# Patient Record
Sex: MALE | Race: WHITE | NOT HISPANIC OR LATINO | Employment: OTHER | URBAN - METROPOLITAN AREA
[De-identification: names, ages, dates, MRNs, and addresses within clinical notes are randomized per-mention and may not be internally consistent; named-entity substitution may affect disease eponyms.]

---

## 2024-09-09 PROBLEM — F10.20 ALCOHOLISM (MULTI): Status: RESOLVED | Noted: 2023-03-22 | Resolved: 2024-09-09

## 2024-10-03 ENCOUNTER — OFFICE VISIT (OUTPATIENT)
Dept: CARDIOLOGY | Facility: CLINIC | Age: 69
End: 2024-10-03
Payer: MEDICARE

## 2024-10-03 VITALS
HEART RATE: 77 BPM | BODY MASS INDEX: 24.41 KG/M2 | WEIGHT: 180 LBS | OXYGEN SATURATION: 94 % | DIASTOLIC BLOOD PRESSURE: 76 MMHG | SYSTOLIC BLOOD PRESSURE: 153 MMHG

## 2024-10-03 DIAGNOSIS — I71.41 PARARENAL ABDOMINAL AORTIC ANEURYSM (AAA) WITHOUT RUPTURE (CMS-HCC): Primary | ICD-10-CM

## 2024-10-03 DIAGNOSIS — I77.810 DILATION OF THORACIC AORTA (CMS-HCC): ICD-10-CM

## 2024-10-03 PROCEDURE — 3078F DIAST BP <80 MM HG: CPT | Performed by: INTERNAL MEDICINE

## 2024-10-03 PROCEDURE — 99214 OFFICE O/P EST MOD 30 MIN: CPT | Performed by: INTERNAL MEDICINE

## 2024-10-03 PROCEDURE — 1159F MED LIST DOCD IN RCRD: CPT | Performed by: INTERNAL MEDICINE

## 2024-10-03 PROCEDURE — 3077F SYST BP >= 140 MM HG: CPT | Performed by: INTERNAL MEDICINE

## 2024-10-03 PROCEDURE — 1036F TOBACCO NON-USER: CPT | Performed by: INTERNAL MEDICINE

## 2024-10-03 PROCEDURE — 4010F ACE/ARB THERAPY RXD/TAKEN: CPT | Performed by: INTERNAL MEDICINE

## 2024-10-03 PROCEDURE — 1160F RVW MEDS BY RX/DR IN RCRD: CPT | Performed by: INTERNAL MEDICINE

## 2024-10-03 NOTE — PROGRESS NOTES
Referred by Oscar Chavez DO      History of Present Illness:  Lorenzo Palafox Jr. is a/an 69 y.o. man with hyperlipidemia, hypertension, diabetes, and tobacco dependence who is referred for small suprarenal abdominal aortic aneurysm. He is asymptomatic.    He sees Dr. Lou for leaky valve. Seems to have been prescribed pravastatin and atorvastatin--both showing up in his med list.    Past Medical History:   Diagnosis Date    Actinic keratosis 03/22/2023    Acute conjunctivitis 03/22/2023    Acute sinusitis 03/22/2023    Allergic rhinitis 03/22/2023    Chronic sinusitis, unspecified     Sinusitis    Cloudy urine 03/22/2023    Cough, unspecified 02/17/2017    Cough    Dermatochalasis 01/24/2020    Dermatochalasis of unspecified eye, unspecified eyelid 01/24/2020    Dermatochalasis    Effusion, left knee 12/28/2015    Knee effusion, left    History of cardiomyopathy 06/22/2023    History of depression 06/14/2022    History of diabetes mellitus 06/22/2023    Injury of right ankle 06/22/2023    Injury of right foot 06/22/2023    Laryngitis 03/22/2023    Low back pain 03/22/2023    Mouth lesion 03/22/2023    Other conditions influencing health status     Childhood Absence Seizure    Other specified disorders of Eustachian tube, unspecified ear 03/19/2014    Eustachian tube dysfunction    Other underimmunization status 06/14/2022    Personal history of other diseases of the circulatory system     History of coronary artery disease    Personal history of other diseases of the circulatory system 02/17/2017    History of valvular heart disease    Personal history of other diseases of the circulatory system     History of hypertrophic cardiomyopathy    Personal history of other diseases of the nervous system and sense organs     History of deafness    Personal history of other diseases of the respiratory system     History of bronchitis    Personal history of other endocrine, nutritional and metabolic disease     History of  hyperlipidemia    Personal history of other endocrine, nutritional and metabolic disease     History of diabetes mellitus    Personal history of other malignant neoplasm of skin 02/17/2017    History of malignant neoplasm of skin    Personal history of other mental and behavioral disorders     History of depression    Personal history of other mental and behavioral disorders     History of depression    Personal history of other specified conditions     History of fatigue    Pinguecula of right eye 01/24/2020    Pinguecula, right eye 01/24/2020    Pinguecula of right eye    Polyuria 03/22/2023    Scrotal skin lesion 03/22/2023    Seasonal allergies 03/22/2023    Sinobronchitis 03/22/2023    Tick bite, initial encounter 03/22/2023    Unspecified abdominal pain 07/26/2016    Flank pain, acute    Unspecified injury of left lower leg, initial encounter 12/28/2015    Knee injury, left, initial encounter    Uses contact lenses 06/22/2023    Vitreous floaters 06/22/2023     Past Surgical History:   Procedure Laterality Date    CT ANGIO NECK  8/17/2018    CT NECK ANGIO W AND WO IV CONTRAST 8/17/2018 [facility]    CT HEAD ANGIO W AND WO IV CONTRAST  8/17/2018    CT HEAD ANGIO W AND WO IV CONTRAST 8/17/2018 [facility]    OTHER SURGICAL HISTORY  05/08/2013    Brain Surgery     Social History     Tobacco Use    Smoking status: Former     Types: Cigarettes     Passive exposure: Past    Smokeless tobacco: Never   Vaping Use    Vaping status: Never Used   Substance Use Topics    Alcohol use: Yes     Comment: occasional    Drug use: Never     Family History   Problem Relation Name Age of Onset    Lung cancer Mother      Lung cancer Sister      Esophageal cancer Mother's Brother      Other (bladder cancer) Paternal Grandfather       Current Outpatient Medications   Medication Sig Dispense Refill    albuterol 2.5 mg /3 mL (0.083 %) nebulizer solution Take 3 mL (2.5 mg) by nebulization every 8 hours if needed for  wheezing. 270 mL 0    atorvastatin (Lipitor) 20 mg tablet TAKE 1 TABLET BY MOUTH EVERY DAY 90 tablet 1    blood sugar diagnostic (OneTouch Ultra Test) strip Check sugars once a day 100 strip 3    blood-glucose meter misc One touch meter to check sugars daily 1 each 0    clindamycin (Cleocin) 300 mg capsule Take 1 capsule (300 mg) by mouth 3 times a day.      ergocalciferol (Vitamin D-2) 1.25 MG (63010 UT) capsule TAKE 1 CAPSULE BY MOUTH ONE TIME PER WEEK 12 capsule 1    fluorouracil (Efudex) 5 % cream 1 Application      fluticasone (Flonase) 50 mcg/actuation nasal spray Administer 1 spray into each nostril 2 times a day. 48 mL 1    ibuprofen 800 mg tablet Take by mouth.      lamoTRIgine (LaMICtal) 200 mg tablet TAKE 1.5 TABLETS (300 MG) BY MOUTH 2 TIMES A DAY. 270 tablet 1    latanoprost (Xalatan) 0.005 % ophthalmic solution INSTILL 1 DROP INTO BOTH EYES AT BEDTIME 7.5 mL 3    lisinopril 20 mg tablet TAKE 1 TABLET BY MOUTH EVERY DAY 90 tablet 2    metFORMIN (Glucophage) 500 mg tablet TAKE 1 TABLET BY MOUTH TWICE A  tablet 1    OneTouch Ultra Test strip once daily.      pravastatin (Pravachol) 10 mg tablet TAKE 1 TABLET BY MOUTH EVERY OTHER DAY 45 tablet 2    venlafaxine XR (Effexor-XR) 37.5 mg 24 hr capsule TAKE 1 CAPSULE BY MOUTH EVERY DAY 90 capsule 1     No current facility-administered medications for this visit.       Physical Examination:  Blood pressure 153/76, pulse 77, weight 81.6 kg (180 lb), SpO2 94%.  General: well-developed, well-nourished, no distress  Head: normocephalic, atraumatic  Eyes: PERRL, anicteric sclerae, no conjunctival injection  ENT: normal oropharynx  Neck: supple, no carotid bruits, no JVD  Lungs: normal respiratory effort, clear to auscultation bilaterally  Heart: regular, normal S1 and S2, no murmurs, rubs or gallops  Abdomen: normal active bowel sounds, soft, non-distended, non-tender  Extremities: no cyanosis or clubbing  Vascular: no edema, pulses 2+ and  symmetric  Musculoskeletal: no deformities  Neurological: alert and oriented, no gross neurological deficits  Psychological: normal mood and affect   Skin: warm and dry, no rashes or lesions        Pertinent Labs:  Component      Latest Ref Rng 8/31/2023   CHOLESTEROL      0 - 199 mg/dL 206 (H)    HDL CHOLESTEROL      mg/dL 45.8    Cholesterol/HDL Ratio 4.5    LDL Calculated      0 - 99 mg/dL 136 (H)    VLDL      0 - 40 mg/dL 24    TRIGLYCERIDES      0 - 149 mg/dL 119    Hemoglobin A1C      % 6.7 !    Estimated Average Glucose      MG/       Legend:  (H) High  ! Abnormal    Pertinent Imaging:  Aortoiliac duplex ultrasound 4/1/2024  CONCLUSIONS:  Aorta/Common Iliac Arteries/IVC: A fusiform aneurysm is noted at the level of the suprarenal aorta. Ectatic mid and distal abdominal aorta. Atherosclerotic plaque is noted throughout the aorta and in the bilateral common iliac arteries. Technically difficult exam due to bowel gas.     Imaging & Doppler Findings:      AORTA     AP    Lateral    PSV  Proximal 3.30 cm 3.30 cm 88.0 cm/s    Mid    2.80 cm 2.80 cm 68.0 cm/s   Distal  2.60 cm 2.60 cm 65.0 cm/s        RIGHT       AP    Lateral    PSV  LIZETTE Proximal 1.30 cm 1.30 cm 75.00 cm/s         LEFT       AP    Lateral     PSV  LIZETTE Proximal 1.40 cm 1.40 cm 102.00 cm/s    Diagnoses and all orders for this visit:  Pararenal abdominal aortic aneurysm (AAA) without rupture (CMS-HCC) (Primary)  -     Referral to Vascular Medicine  -     Vascular US Aorta Iliac Duplex Complete; Future  Dilation of thoracic aorta (CMS-HCC)  Repeat echo  Stop pravastatin    Nata Monsivais MD, MS

## 2024-10-03 NOTE — PATIENT INSTRUCTIONS
I want you to resume a baby aspirin once a day (81 mg).    I will check with Dr. Lou on which statin medication you are supposed to be on. Your med list has pravastatin and atorvastatin listed--these do the same thing. We should plan to stop one of them.    I want you to get a repeat ultrasound of the aorta in about six months. You can do this at [facility] and see me immediately afterwards. I will have the schedulers there give you a call to get that scheduled.    You should not have anything to eat or drink after midnight the day before the test, including coffee, tea, and pop. You should not chew gum before the test. If you smoke, you should not smoke before the test. All of these things increase bowel gas and make getting clear pictures difficult.    You can take morning medications with small sips of water.    Should you have questions, please do not hesitate to call my office at [phone], or you can reach me on [facility] if you have signed up for it. If you have urgent concerns, call the office, do not use [facility].

## 2024-10-18 ENCOUNTER — LAB (OUTPATIENT)
Dept: LAB | Facility: LAB | Age: 69
End: 2024-10-18
Payer: MEDICARE

## 2024-10-18 DIAGNOSIS — Z12.5 SCREENING FOR PROSTATE CANCER: ICD-10-CM

## 2024-10-18 DIAGNOSIS — I10 PRIMARY HYPERTENSION: ICD-10-CM

## 2024-10-18 DIAGNOSIS — E78.5 HYPERLIPIDEMIA, UNSPECIFIED HYPERLIPIDEMIA TYPE: ICD-10-CM

## 2024-10-18 DIAGNOSIS — E11.9 TYPE 2 DIABETES MELLITUS WITHOUT COMPLICATION, WITHOUT LONG-TERM CURRENT USE OF INSULIN (MULTI): ICD-10-CM

## 2024-10-18 LAB
ALBUMIN SERPL BCP-MCNC: 4.3 G/DL (ref 3.4–5)
ALP SERPL-CCNC: 68 U/L (ref 33–136)
ALT SERPL W P-5'-P-CCNC: 12 U/L (ref 10–52)
ANION GAP SERPL CALC-SCNC: 11 MMOL/L (ref 10–20)
AST SERPL W P-5'-P-CCNC: 14 U/L (ref 9–39)
BILIRUB SERPL-MCNC: 0.6 MG/DL (ref 0–1.2)
BUN SERPL-MCNC: 13 MG/DL (ref 6–23)
CALCIUM SERPL-MCNC: 9.3 MG/DL (ref 8.6–10.3)
CHLORIDE SERPL-SCNC: 99 MMOL/L (ref 98–107)
CHOLEST SERPL-MCNC: 155 MG/DL (ref 0–199)
CHOLESTEROL/HDL RATIO: 2.8
CO2 SERPL-SCNC: 31 MMOL/L (ref 21–32)
CREAT SERPL-MCNC: 0.99 MG/DL (ref 0.5–1.3)
EGFRCR SERPLBLD CKD-EPI 2021: 82 ML/MIN/1.73M*2
ERYTHROCYTE [DISTWIDTH] IN BLOOD BY AUTOMATED COUNT: 14.8 % (ref 11.5–14.5)
GLUCOSE SERPL-MCNC: 138 MG/DL (ref 74–99)
HCT VFR BLD AUTO: 43.6 % (ref 41–52)
HDLC SERPL-MCNC: 56 MG/DL
HGB BLD-MCNC: 13.5 G/DL (ref 13.5–17.5)
LDLC SERPL CALC-MCNC: 78 MG/DL
MCH RBC QN AUTO: 28.5 PG (ref 26–34)
MCHC RBC AUTO-ENTMCNC: 31 G/DL (ref 32–36)
MCV RBC AUTO: 92 FL (ref 80–100)
NON HDL CHOLESTEROL: 99 MG/DL (ref 0–149)
NRBC BLD-RTO: 0 /100 WBCS (ref 0–0)
PLATELET # BLD AUTO: 229 X10*3/UL (ref 150–450)
POTASSIUM SERPL-SCNC: 5 MMOL/L (ref 3.5–5.3)
PROT SERPL-MCNC: 7.3 G/DL (ref 6.4–8.2)
RBC # BLD AUTO: 4.73 X10*6/UL (ref 4.5–5.9)
SODIUM SERPL-SCNC: 136 MMOL/L (ref 136–145)
TRIGL SERPL-MCNC: 105 MG/DL (ref 0–149)
VLDL: 21 MG/DL (ref 0–40)
WBC # BLD AUTO: 6.8 X10*3/UL (ref 4.4–11.3)

## 2024-10-18 PROCEDURE — G0103 PSA SCREENING: HCPCS

## 2024-10-18 PROCEDURE — 82570 ASSAY OF URINE CREATININE: CPT

## 2024-10-18 PROCEDURE — 80053 COMPREHEN METABOLIC PANEL: CPT

## 2024-10-18 PROCEDURE — 80061 LIPID PANEL: CPT

## 2024-10-18 PROCEDURE — 83036 HEMOGLOBIN GLYCOSYLATED A1C: CPT

## 2024-10-18 PROCEDURE — 82043 UR ALBUMIN QUANTITATIVE: CPT

## 2024-10-18 PROCEDURE — 36415 COLL VENOUS BLD VENIPUNCTURE: CPT

## 2024-10-18 PROCEDURE — 85027 COMPLETE CBC AUTOMATED: CPT

## 2024-10-19 LAB
CREAT UR-MCNC: 84.1 MG/DL (ref 20–370)
EST. AVERAGE GLUCOSE BLD GHB EST-MCNC: 146 MG/DL
HBA1C MFR BLD: 6.7 %
MICROALBUMIN UR-MCNC: 7.7 MG/L
MICROALBUMIN/CREAT UR: 9.2 UG/MG CREAT
PSA SERPL-MCNC: 2.07 NG/ML

## 2024-11-20 DIAGNOSIS — H40.003 PREGLAUCOMA, UNSPECIFIED, BILATERAL: ICD-10-CM

## 2024-11-20 RX ORDER — LATANOPROST 50 UG/ML
1 SOLUTION/ DROPS OPHTHALMIC NIGHTLY
Qty: 7.5 ML | Refills: 3 | Status: SHIPPED | OUTPATIENT
Start: 2024-11-20

## 2024-12-07 DIAGNOSIS — G40.909 NONINTRACTABLE EPILEPSY WITHOUT STATUS EPILEPTICUS, UNSPECIFIED EPILEPSY TYPE (MULTI): ICD-10-CM

## 2024-12-09 RX ORDER — LAMOTRIGINE 200 MG/1
300 TABLET ORAL 2 TIMES DAILY
Qty: 270 TABLET | Refills: 1 | Status: SHIPPED | OUTPATIENT
Start: 2024-12-09 | End: 2025-06-07

## 2025-01-30 DIAGNOSIS — I50.9 HEART FAILURE, UNSPECIFIED: ICD-10-CM

## 2025-01-30 RX ORDER — LISINOPRIL 20 MG/1
20 TABLET ORAL DAILY
Qty: 90 TABLET | Refills: 0 | Status: SHIPPED | OUTPATIENT
Start: 2025-01-30

## 2025-01-31 DIAGNOSIS — E78.5 HYPERLIPIDEMIA, UNSPECIFIED HYPERLIPIDEMIA TYPE: ICD-10-CM

## 2025-01-31 DIAGNOSIS — E11.9 TYPE 2 DIABETES MELLITUS WITHOUT COMPLICATIONS (MULTI): ICD-10-CM

## 2025-01-31 DIAGNOSIS — F41.9 ANXIETY: ICD-10-CM

## 2025-02-02 RX ORDER — ATORVASTATIN CALCIUM 20 MG/1
20 TABLET, FILM COATED ORAL DAILY
Qty: 90 TABLET | Refills: 1 | Status: SHIPPED | OUTPATIENT
Start: 2025-02-02

## 2025-02-02 RX ORDER — VENLAFAXINE HYDROCHLORIDE 37.5 MG/1
37.5 CAPSULE, EXTENDED RELEASE ORAL DAILY
Qty: 90 CAPSULE | Refills: 1 | Status: SHIPPED | OUTPATIENT
Start: 2025-02-02

## 2025-02-02 RX ORDER — METFORMIN HYDROCHLORIDE 500 MG/1
500 TABLET ORAL 2 TIMES DAILY
Qty: 180 TABLET | Refills: 1 | Status: SHIPPED | OUTPATIENT
Start: 2025-02-02

## 2025-02-25 DIAGNOSIS — E11.9 TYPE 2 DIABETES MELLITUS WITHOUT COMPLICATION, WITHOUT LONG-TERM CURRENT USE OF INSULIN (MULTI): Primary | ICD-10-CM

## 2025-02-26 RX ORDER — BLOOD SUGAR DIAGNOSTIC
100 STRIP MISCELLANEOUS
Qty: 100 EACH | Refills: 1 | Status: SHIPPED | OUTPATIENT
Start: 2025-02-26

## 2025-02-27 ENCOUNTER — OFFICE VISIT (OUTPATIENT)
Dept: CARDIOLOGY | Facility: HOSPITAL | Age: 70
End: 2025-02-27
Payer: MEDICARE

## 2025-02-27 ENCOUNTER — HOSPITAL ENCOUNTER (OUTPATIENT)
Dept: CARDIOLOGY | Facility: HOSPITAL | Age: 70
Discharge: HOME | End: 2025-02-27
Payer: MEDICARE

## 2025-02-27 VITALS
OXYGEN SATURATION: 97 % | HEART RATE: 72 BPM | SYSTOLIC BLOOD PRESSURE: 160 MMHG | WEIGHT: 185.41 LBS | BODY MASS INDEX: 25.15 KG/M2 | DIASTOLIC BLOOD PRESSURE: 84 MMHG

## 2025-02-27 DIAGNOSIS — I50.9 OTHER CONGESTIVE HEART FAILURE: ICD-10-CM

## 2025-02-27 DIAGNOSIS — I10 ESSENTIAL HYPERTENSION: ICD-10-CM

## 2025-02-27 DIAGNOSIS — Z86.79 HISTORY OF RHEUMATIC FEVER AS A CHILD: ICD-10-CM

## 2025-02-27 DIAGNOSIS — I35.1 AORTIC VALVE INSUFFICIENCY, ETIOLOGY OF CARDIAC VALVE DISEASE UNSPECIFIED: ICD-10-CM

## 2025-02-27 DIAGNOSIS — I10 ESSENTIAL HYPERTENSION: Primary | ICD-10-CM

## 2025-02-27 DIAGNOSIS — I35.1 NONRHEUMATIC AORTIC VALVE INSUFFICIENCY: ICD-10-CM

## 2025-02-27 LAB
AORTIC VALVE MEAN GRADIENT: 8 MMHG
AORTIC VALVE PEAK VELOCITY: 1.93 M/S
ATRIAL RATE: 72 BPM
AV PEAK GRADIENT: 15 MMHG
AVA (PEAK VEL): 2.28 CM2
AVA (VTI): 2.35 CM2
EJECTION FRACTION APICAL 4 CHAMBER: 51.6
EJECTION FRACTION: 53 %
LEFT ATRIUM VOLUME AREA LENGTH INDEX BSA: 32.6 ML/M2
LEFT VENTRICLE INTERNAL DIMENSION DIASTOLE: 4.76 CM (ref 3.5–6)
LEFT VENTRICULAR OUTFLOW TRACT DIAMETER: 2.02 CM
MITRAL VALVE E/A RATIO: 0.64
P AXIS: 33 DEGREES
P OFFSET: 172 MS
P ONSET: 105 MS
PR INTERVAL: 230 MS
Q ONSET: 220 MS
QRS COUNT: 12 BEATS
QRS DURATION: 116 MS
QT INTERVAL: 410 MS
QTC CALCULATION(BAZETT): 448 MS
QTC FREDERICIA: 435 MS
R AXIS: -4 DEGREES
RIGHT VENTRICLE FREE WALL PEAK S': 16 CM/S
RIGHT VENTRICLE PEAK SYSTOLIC PRESSURE: 36.2 MMHG
T AXIS: 72 DEGREES
T OFFSET: 425 MS
TRICUSPID ANNULAR PLANE SYSTOLIC EXCURSION: 3.4 CM
VENTRICULAR RATE: 72 BPM

## 2025-02-27 PROCEDURE — 93306 TTE W/DOPPLER COMPLETE: CPT

## 2025-02-27 PROCEDURE — 3077F SYST BP >= 140 MM HG: CPT | Performed by: INTERNAL MEDICINE

## 2025-02-27 PROCEDURE — 99214 OFFICE O/P EST MOD 30 MIN: CPT | Performed by: INTERNAL MEDICINE

## 2025-02-27 PROCEDURE — G2211 COMPLEX E/M VISIT ADD ON: HCPCS | Performed by: INTERNAL MEDICINE

## 2025-02-27 PROCEDURE — 93005 ELECTROCARDIOGRAM TRACING: CPT | Performed by: INTERNAL MEDICINE

## 2025-02-27 PROCEDURE — 1036F TOBACCO NON-USER: CPT | Performed by: INTERNAL MEDICINE

## 2025-02-27 PROCEDURE — 1159F MED LIST DOCD IN RCRD: CPT | Performed by: INTERNAL MEDICINE

## 2025-02-27 PROCEDURE — 4010F ACE/ARB THERAPY RXD/TAKEN: CPT | Performed by: INTERNAL MEDICINE

## 2025-02-27 PROCEDURE — 93306 TTE W/DOPPLER COMPLETE: CPT | Performed by: INTERNAL MEDICINE

## 2025-02-27 PROCEDURE — 3079F DIAST BP 80-89 MM HG: CPT | Performed by: INTERNAL MEDICINE

## 2025-02-27 RX ORDER — LISINOPRIL 40 MG/1
40 TABLET ORAL DAILY
Qty: 90 TABLET | Refills: 3 | Status: SHIPPED | OUTPATIENT
Start: 2025-02-27 | End: 2026-02-27

## 2025-02-27 NOTE — PROGRESS NOTES
Chief Complaint:   No chief complaint on file.     History Of Present Illness:    Lorenzo Palafox Jr. is a 69 y.o. male presenting for follow-up.  Doing well from a cardiac standpoint.  Denies any chest pain, progressive dyspnea, dizziness, palpitations, edema.  Compliant with medications.     Last Recorded Vitals:  Vitals:    02/27/25 1107   BP: 160/84   Pulse: 72   SpO2: 97%   Weight: 84.1 kg (185 lb 6.5 oz)       Past Medical History:  He has a past medical history of Actinic keratosis (03/22/2023), Acute conjunctivitis (03/22/2023), Acute sinusitis (03/22/2023), Allergic rhinitis (03/22/2023), Chronic sinusitis, unspecified, Cloudy urine (03/22/2023), Cough, unspecified (02/17/2017), Dermatochalasis (01/24/2020), Dermatochalasis of unspecified eye, unspecified eyelid (01/24/2020), Effusion, left knee (12/28/2015), History of cardiomyopathy (06/22/2023), History of depression (06/14/2022), History of diabetes mellitus (06/22/2023), Injury of right ankle (06/22/2023), Injury of right foot (06/22/2023), Laryngitis (03/22/2023), Low back pain (03/22/2023), Mouth lesion (03/22/2023), Other conditions influencing health status, Other specified disorders of Eustachian tube, unspecified ear (03/19/2014), Other underimmunization status (06/14/2022), Personal history of other diseases of the circulatory system, Personal history of other diseases of the circulatory system (02/17/2017), Personal history of other diseases of the circulatory system, Personal history of other diseases of the nervous system and sense organs, Personal history of other diseases of the respiratory system, Personal history of other endocrine, nutritional and metabolic disease, Personal history of other endocrine, nutritional and metabolic disease, Personal history of other malignant neoplasm of skin (02/17/2017), Personal history of other mental and behavioral disorders, Personal history of other mental and behavioral disorders, Personal history  of other specified conditions, Pinguecula of right eye (01/24/2020), Pinguecula, right eye (01/24/2020), Polyuria (03/22/2023), Scrotal skin lesion (03/22/2023), Seasonal allergies (03/22/2023), Sinobronchitis (03/22/2023), Tick bite, initial encounter (03/22/2023), Unspecified abdominal pain (07/26/2016), Unspecified injury of left lower leg, initial encounter (12/28/2015), Uses contact lenses (06/22/2023), and Vitreous floaters (06/22/2023).    Past Surgical History:  He has a past surgical history that includes Other surgical history (05/08/2013); CT angio head w and wo IV contrast (8/17/2018); and CT angio neck (8/17/2018).      Social History:  He reports that he has quit smoking. His smoking use included cigarettes. He has been exposed to tobacco smoke. He has never used smokeless tobacco. He reports current alcohol use. He reports that he does not use drugs.    Family History:  Family History   Problem Relation Name Age of Onset    Lung cancer Mother      Lung cancer Sister      Esophageal cancer Mother's Brother      Other (bladder cancer) Paternal Grandfather          Allergies:  Erythromycin and Penicillins    Outpatient Medications:  Current Outpatient Medications   Medication Instructions    albuterol 2.5 mg, nebulization, Every 8 hours PRN    atorvastatin (LIPITOR) 20 mg, oral, Daily    blood sugar diagnostic (OneTouch Ultra Test) strip Check sugars once a day    blood-glucose meter misc One touch meter to check sugars daily    clindamycin (CLEOCIN) 300 mg, 3 times daily    ergocalciferol (VITAMIN D-2) 1,250 mcg, oral, Once Weekly    fluorouracil (Efudex) 5 % cream 1 Application    fluticasone (Flonase) 50 mcg/actuation nasal spray 1 spray, Each Nostril, 2 times daily    ibuprofen 800 mg tablet Take by mouth.    lamoTRIgine (LAMICTAL) 300 mg, oral, 2 times daily    latanoprost (Xalatan) 0.005 % ophthalmic solution 1 drop, Both Eyes, Nightly    lisinopril 20 mg, oral, Daily    metFORMIN (GLUCOPHAGE) 500  mg, oral, 2 times daily    OneTouch Ultra Test strip 100 each, Does not apply, Daily RT    venlafaxine XR (EFFEXOR-XR) 37.5 mg, oral, Daily       Physical Exam:  Constitutional:       Appearance: Healthy appearance. Not in distress.   Neck:      Vascular: No JVR. JVD normal.   Pulmonary:      Effort: Pulmonary effort is normal.      Breath sounds: Normal breath sounds. No wheezing. No rhonchi. No rales.   Chest:      Chest wall: Not tender to palpatation.   Cardiovascular:      Normal rate. Regular rhythm.      Murmurs: There is no murmur.   Edema:     Peripheral edema absent.   Abdominal:      General: Bowel sounds are normal.      Palpations: Abdomen is soft.      Tenderness: There is no abdominal tenderness.   Musculoskeletal: Normal range of motion. Skin:     General: Skin is warm and dry.   Neurological:      General: No focal deficit present.      Mental Status: Alert and oriented to person, place and time.           Last Labs:  CBC -  Lab Results   Component Value Date    WBC 6.8 10/18/2024    HGB 13.5 10/18/2024    HCT 43.6 10/18/2024    MCV 92 10/18/2024     10/18/2024       CMP -  Lab Results   Component Value Date    CALCIUM 9.3 10/18/2024    PROT 7.3 10/18/2024    ALBUMIN 4.3 10/18/2024    AST 14 10/18/2024    ALT 12 10/18/2024    ALKPHOS 68 10/18/2024    BILITOT 0.6 10/18/2024       LIPID PANEL -   Lab Results   Component Value Date    CHOL 155 10/18/2024    TRIG 105 10/18/2024    HDL 56.0 10/18/2024    CHHDL 2.8 10/18/2024    LDLF 136 (H) 08/31/2023    VLDL 21 10/18/2024    NHDL 99 10/18/2024       RENAL FUNCTION PANEL -   Lab Results   Component Value Date    GLUCOSE 138 (H) 10/18/2024     10/18/2024    K 5.0 10/18/2024    CL 99 10/18/2024    CO2 31 10/18/2024    ANIONGAP 11 10/18/2024    BUN 13 10/18/2024    CREATININE 0.99 10/18/2024    GFRMALE 82 08/31/2023    CALCIUM 9.3 10/18/2024    ALBUMIN 4.3 10/18/2024        Lab Results   Component Value Date    BNP 39 12/01/2023    HGBA1C 6.7  (H) 10/18/2024       Last Cardiology Tests:  ECG independently reviewed 12/1/23 sinus rhythm first degree AV block,  75bpm LVH     Echo 2/29/24   1. Left ventricular systolic function is low normal with a 50-55% estimated ejection fraction.   2. Spectral Doppler shows an impaired relaxation pattern of left ventricular diastolic filling.   3. Mild to moderate mitral valve regurgitation.   4. Moderate aortic valve regurgitation. There is mild dilatation of the aortic root.   5. There is mild tricuspid regurgitation.     Echo 3/2/23:   1. Left ventricular systolic function is normal with a 55-60% estimated ejection fraction.  2. Spectral Doppler shows an impaired relaxation pattern of left ventricular diastolic filling.  3. Mild to moderate mitral valve regurgitation.  4. Mild aortic valve stenosis.  5. Moderate aortic valve regurgitation. There is mild dilatation of the ascending aorta (4.2cm).     Echo 1/19/22:  1. The left ventricular systolic function is low normal, with an estimated ejection fraction of 50-55%. The calculated ejection fraction is borderline at 52 % using the Jacobo's Bi-plane MOD calculation. There is global hypokinesis of the left ventricle with minor regional variations. The left ventricular cavity size is normal. There is asymetrical focal thickness of the interventricular septum at the base measuring 1.4 cm. The mid interventricular septum and posterior left ventricular walls are mildly thickened measuring 1.1 cm. There is left ventricular concentric remodeling. LV end systolic volume index is normal to borderline (31 ml/m2), and LV end systolic diameter is 4.6 cm.  2. The aortic valve is trileaflet. There is moderate aortic valve regurgitation. Jet is eccentric. Pressure half life is 327 msec. There is no evidence of holodiastolic flow reversal in the descending or abdominal aorta.  3. There is mild mitral valve prolapse of the anterior and posterior leaflets. There is mild to moderate  mitral valve regurgitation which is centrally directed. MR is holo-systolic.     Stress Test 7/11/2017:   1. Equivocal stress test due to development of IVCD/LBBB with exercise, which persisted into recovery.  2. Abnormal Stress test.  (Nuclear): No definite evidence of ischemia. In the setting of diaphragmatic attenuation, cannot rule out a small basal inferior wall scar. Left ventricular dilation is noted. Left ventricular ejection fraction was calculated to be 53% which is within normal limits.     Assessment/Plan   Very pleasant 69 year-old gentleman with HTN, dyslipidemia, epilepsy, low normal EF on previous echo, Moderate AI and mild-moderate MR in setting of mild MVP.      He is doing well from a cardiovascular standpoint.  Echo today showed moderate AI, which has been stable, may be lower pressure half-time's on this echo compared to prior.  Blood pressure does need better control.  Plan to increase lisinopril to 40 mg daily, continue current aspirin and pravastatin.  Will see him again in 6 months to ensure adequate blood pressure control with plans to repeat echo next year for surveillance of his AI.         Alberto Lou MD

## 2025-04-09 ENCOUNTER — APPOINTMENT (OUTPATIENT)
Dept: ORTHOPEDIC SURGERY | Facility: CLINIC | Age: 70
End: 2025-04-09
Payer: MEDICARE

## 2025-04-09 ENCOUNTER — APPOINTMENT (OUTPATIENT)
Dept: RADIOLOGY | Facility: CLINIC | Age: 70
End: 2025-04-09
Payer: MEDICARE

## 2025-04-09 ENCOUNTER — HOSPITAL ENCOUNTER (OUTPATIENT)
Dept: RADIOLOGY | Facility: CLINIC | Age: 70
End: 2025-04-09
Payer: MEDICARE

## 2025-04-30 ENCOUNTER — APPOINTMENT (OUTPATIENT)
Dept: PRIMARY CARE | Facility: CLINIC | Age: 70
End: 2025-04-30
Payer: MEDICARE

## 2025-05-02 DIAGNOSIS — G40.909 NONINTRACTABLE EPILEPSY WITHOUT STATUS EPILEPTICUS, UNSPECIFIED EPILEPSY TYPE (MULTI): ICD-10-CM

## 2025-05-04 RX ORDER — LAMOTRIGINE 200 MG/1
300 TABLET ORAL 2 TIMES DAILY
Qty: 270 TABLET | Refills: 1 | Status: SHIPPED | OUTPATIENT
Start: 2025-05-04 | End: 2025-10-31

## 2025-06-11 DIAGNOSIS — I10 ESSENTIAL HYPERTENSION: ICD-10-CM

## 2025-06-11 RX ORDER — LISINOPRIL 40 MG/1
40 TABLET ORAL DAILY
Qty: 90 TABLET | Refills: 1 | Status: SHIPPED | OUTPATIENT
Start: 2025-06-11 | End: 2026-06-11

## 2025-06-17 DIAGNOSIS — I10 ESSENTIAL HYPERTENSION: ICD-10-CM

## 2025-06-21 RX ORDER — LISINOPRIL 40 MG/1
40 TABLET ORAL DAILY
Qty: 90 TABLET | Refills: 1 | Status: SHIPPED | OUTPATIENT
Start: 2025-06-21 | End: 2026-06-21

## 2025-07-02 ENCOUNTER — APPOINTMENT (OUTPATIENT)
Dept: OPHTHALMOLOGY | Facility: CLINIC | Age: 70
End: 2025-07-02
Payer: MEDICARE

## 2025-07-02 DIAGNOSIS — H40.1121 PRIMARY OPEN ANGLE GLAUCOMA (POAG) OF LEFT EYE, MILD STAGE: ICD-10-CM

## 2025-07-02 DIAGNOSIS — H40.1113 PRIMARY OPEN ANGLE GLAUCOMA (POAG) OF RIGHT EYE, SEVERE STAGE: Primary | ICD-10-CM

## 2025-07-02 PROCEDURE — G2211 COMPLEX E/M VISIT ADD ON: HCPCS | Performed by: OPHTHALMOLOGY

## 2025-07-02 PROCEDURE — 99213 OFFICE O/P EST LOW 20 MIN: CPT | Performed by: OPHTHALMOLOGY

## 2025-07-02 ASSESSMENT — TONOMETRY
IOP_METHOD: GOLDMANN APPLANATION
OD_IOP_MMHG: 11
OS_IOP_MMHG: 13

## 2025-07-02 ASSESSMENT — PATIENT HEALTH QUESTIONNAIRE - PHQ9
2. FEELING DOWN, DEPRESSED OR HOPELESS: NOT AT ALL
1. LITTLE INTEREST OR PLEASURE IN DOING THINGS: NOT AT ALL
SUM OF ALL RESPONSES TO PHQ9 QUESTIONS 1 AND 2: 0

## 2025-07-02 ASSESSMENT — VISUAL ACUITY
METHOD: SNELLEN - SINGLE
OS_SC: 20/25
OD_SC: 20/25
OS_SC+: -1
OD_SC+: -2

## 2025-07-02 ASSESSMENT — SLIT LAMP EXAM - LIDS
COMMENTS: GOOD POSITION
COMMENTS: GOOD POSITION

## 2025-07-02 ASSESSMENT — EXTERNAL EXAM - LEFT EYE: OS_EXAM: NORMAL

## 2025-07-02 ASSESSMENT — PAIN SCALES - GENERAL: PAINLEVEL_OUTOF10: 0-NO PAIN

## 2025-07-02 ASSESSMENT — PACHYMETRY
OS_CT(UM): 548
OD_CT(UM): 553

## 2025-07-02 ASSESSMENT — EXTERNAL EXAM - RIGHT EYE: OD_EXAM: NORMAL
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
## 2025-07-29 DIAGNOSIS — F41.9 ANXIETY: ICD-10-CM

## 2025-07-29 DIAGNOSIS — E11.9 TYPE 2 DIABETES MELLITUS WITHOUT COMPLICATIONS: ICD-10-CM

## 2025-07-29 DIAGNOSIS — E78.5 HYPERLIPIDEMIA, UNSPECIFIED HYPERLIPIDEMIA TYPE: ICD-10-CM

## 2025-07-30 RX ORDER — VENLAFAXINE HYDROCHLORIDE 37.5 MG/1
37.5 CAPSULE, EXTENDED RELEASE ORAL DAILY
Qty: 90 CAPSULE | Refills: 1 | Status: SHIPPED | OUTPATIENT
Start: 2025-07-30

## 2025-07-30 RX ORDER — METFORMIN HYDROCHLORIDE 500 MG/1
500 TABLET ORAL 2 TIMES DAILY
Qty: 180 TABLET | Refills: 1 | Status: SHIPPED | OUTPATIENT
Start: 2025-07-30

## 2025-07-30 RX ORDER — ATORVASTATIN CALCIUM 20 MG/1
20 TABLET, FILM COATED ORAL DAILY
Qty: 90 TABLET | Refills: 1 | Status: SHIPPED | OUTPATIENT
Start: 2025-07-30

## 2025-08-21 ENCOUNTER — OFFICE VISIT (OUTPATIENT)
Dept: CARDIOLOGY | Facility: HOSPITAL | Age: 70
End: 2025-08-21
Payer: MEDICARE

## 2025-08-21 VITALS
OXYGEN SATURATION: 98 % | WEIGHT: 177.25 LBS | BODY MASS INDEX: 24.04 KG/M2 | SYSTOLIC BLOOD PRESSURE: 157 MMHG | HEART RATE: 79 BPM | DIASTOLIC BLOOD PRESSURE: 81 MMHG

## 2025-08-21 DIAGNOSIS — I10 ESSENTIAL HYPERTENSION: Primary | ICD-10-CM

## 2025-08-21 DIAGNOSIS — I35.1 NONRHEUMATIC AORTIC VALVE INSUFFICIENCY: ICD-10-CM

## 2025-08-21 LAB
ATRIAL RATE: 78 BPM
P AXIS: 106 DEGREES
P OFFSET: 144 MS
P ONSET: 116 MS
PR INTERVAL: 210 MS
Q ONSET: 221 MS
QRS COUNT: 12 BEATS
QRS DURATION: 134 MS
QT INTERVAL: 412 MS
QTC CALCULATION(BAZETT): 469 MS
QTC FREDERICIA: 449 MS
R AXIS: -54 DEGREES
T AXIS: 85 DEGREES
T OFFSET: 427 MS
VENTRICULAR RATE: 78 BPM

## 2025-08-21 PROCEDURE — 3077F SYST BP >= 140 MM HG: CPT | Performed by: INTERNAL MEDICINE

## 2025-08-21 PROCEDURE — 3079F DIAST BP 80-89 MM HG: CPT | Performed by: INTERNAL MEDICINE

## 2025-08-21 PROCEDURE — 4010F ACE/ARB THERAPY RXD/TAKEN: CPT | Performed by: INTERNAL MEDICINE

## 2025-08-21 PROCEDURE — G2211 COMPLEX E/M VISIT ADD ON: HCPCS | Performed by: INTERNAL MEDICINE

## 2025-08-21 PROCEDURE — 1159F MED LIST DOCD IN RCRD: CPT | Performed by: INTERNAL MEDICINE

## 2025-08-21 PROCEDURE — 99212 OFFICE O/P EST SF 10 MIN: CPT

## 2025-08-21 PROCEDURE — 93005 ELECTROCARDIOGRAM TRACING: CPT | Performed by: INTERNAL MEDICINE

## 2025-08-21 PROCEDURE — 99214 OFFICE O/P EST MOD 30 MIN: CPT | Performed by: INTERNAL MEDICINE

## 2025-08-21 RX ORDER — AMLODIPINE BESYLATE 5 MG/1
5 TABLET ORAL DAILY
Qty: 90 TABLET | Refills: 3 | Status: SHIPPED | OUTPATIENT
Start: 2025-08-21 | End: 2026-08-21

## 2025-09-02 ENCOUNTER — APPOINTMENT (OUTPATIENT)
Dept: DERMATOLOGY | Facility: CLINIC | Age: 70
End: 2025-09-02
Payer: MEDICARE

## 2025-09-15 ENCOUNTER — APPOINTMENT (OUTPATIENT)
Dept: PRIMARY CARE | Facility: CLINIC | Age: 70
End: 2025-09-15
Payer: MEDICARE

## 2026-01-05 ENCOUNTER — APPOINTMENT (OUTPATIENT)
Dept: OPHTHALMOLOGY | Facility: CLINIC | Age: 71
End: 2026-01-05
Payer: MEDICARE

## 2026-09-04 ENCOUNTER — APPOINTMENT (OUTPATIENT)
Dept: DERMATOLOGY | Facility: CLINIC | Age: 71
End: 2026-09-04
Payer: MEDICARE